# Patient Record
Sex: FEMALE | Race: BLACK OR AFRICAN AMERICAN | Employment: FULL TIME | URBAN - METROPOLITAN AREA
[De-identification: names, ages, dates, MRNs, and addresses within clinical notes are randomized per-mention and may not be internally consistent; named-entity substitution may affect disease eponyms.]

---

## 2022-12-13 ENCOUNTER — ULTRASOUND ENCOUNTER (OUTPATIENT)
Facility: CLINIC | Age: 32
End: 2022-12-13
Payer: COMMERCIAL

## 2022-12-13 ENCOUNTER — LAB ENCOUNTER (OUTPATIENT)
Dept: LAB | Age: 32
End: 2022-12-13
Attending: OBSTETRICS & GYNECOLOGY
Payer: COMMERCIAL

## 2022-12-13 ENCOUNTER — INITIAL PRENATAL (OUTPATIENT)
Facility: CLINIC | Age: 32
End: 2022-12-13
Payer: COMMERCIAL

## 2022-12-13 VITALS
SYSTOLIC BLOOD PRESSURE: 122 MMHG | HEART RATE: 94 BPM | BODY MASS INDEX: 35.16 KG/M2 | HEIGHT: 65 IN | DIASTOLIC BLOOD PRESSURE: 80 MMHG | WEIGHT: 211 LBS

## 2022-12-13 DIAGNOSIS — O36.80X0 PREGNANCY WITH INCONCLUSIVE FETAL VIABILITY, SINGLE OR UNSPECIFIED FETUS: Primary | ICD-10-CM

## 2022-12-13 DIAGNOSIS — Z12.4 CERVICAL CANCER SCREENING: ICD-10-CM

## 2022-12-13 DIAGNOSIS — Z3A.09 9 WEEKS GESTATION OF PREGNANCY: ICD-10-CM

## 2022-12-13 DIAGNOSIS — Z34.01 ENCOUNTER FOR SUPERVISION OF NORMAL FIRST PREGNANCY IN FIRST TRIMESTER: Primary | ICD-10-CM

## 2022-12-13 DIAGNOSIS — Z34.01 ENCOUNTER FOR SUPERVISION OF NORMAL FIRST PREGNANCY IN FIRST TRIMESTER: ICD-10-CM

## 2022-12-13 PROBLEM — O99.210 OBESITY AFFECTING PREGNANCY, ANTEPARTUM: Status: ACTIVE | Noted: 2022-12-13

## 2022-12-13 LAB
ALBUMIN SERPL-MCNC: 3.6 G/DL (ref 3.4–5)
ALBUMIN/GLOB SERPL: 0.8 {RATIO} (ref 1–2)
ALP LIVER SERPL-CCNC: 48 U/L
ALT SERPL-CCNC: 14 U/L
ANION GAP SERPL CALC-SCNC: 7 MMOL/L (ref 0–18)
ANTIBODY SCREEN: NEGATIVE
APPEARANCE: CLEAR
AST SERPL-CCNC: 9 U/L (ref 15–37)
BASOPHILS # BLD AUTO: 0.03 X10(3) UL (ref 0–0.2)
BASOPHILS NFR BLD AUTO: 0.4 %
BILIRUB SERPL-MCNC: 0.4 MG/DL (ref 0.1–2)
BILIRUBIN: NEGATIVE
BUN BLD-MCNC: 10 MG/DL (ref 7–18)
CALCIUM BLD-MCNC: 9.5 MG/DL (ref 8.5–10.1)
CHLORIDE SERPL-SCNC: 104 MMOL/L (ref 98–112)
CO2 SERPL-SCNC: 26 MMOL/L (ref 21–32)
CREAT BLD-MCNC: 0.69 MG/DL
EOSINOPHIL # BLD AUTO: 0.02 X10(3) UL (ref 0–0.7)
EOSINOPHIL NFR BLD AUTO: 0.3 %
ERYTHROCYTE [DISTWIDTH] IN BLOOD BY AUTOMATED COUNT: 12.4 %
EST. AVERAGE GLUCOSE BLD GHB EST-MCNC: 97 MG/DL (ref 68–126)
FASTING STATUS PATIENT QL REPORTED: YES
GFR SERPLBLD BASED ON 1.73 SQ M-ARVRAT: 118 ML/MIN/1.73M2 (ref 60–?)
GLOBULIN PLAS-MCNC: 4.4 G/DL (ref 2.8–4.4)
GLUCOSE (URINE DIPSTICK): NEGATIVE MG/DL
GLUCOSE BLD-MCNC: 88 MG/DL (ref 70–99)
HBA1C MFR BLD: 5 % (ref ?–5.7)
HBV SURFACE AG SER-ACNC: 0.29 [IU]/L
HBV SURFACE AG SERPL QL IA: NONREACTIVE
HCT VFR BLD AUTO: 39 %
HGB BLD-MCNC: 13.5 G/DL
IMM GRANULOCYTES # BLD AUTO: 0.02 X10(3) UL (ref 0–1)
IMM GRANULOCYTES NFR BLD: 0.3 %
KETONES (URINE DIPSTICK): NEGATIVE MG/DL
LEUKOCYTES: NEGATIVE
LYMPHOCYTES # BLD AUTO: 1.93 X10(3) UL (ref 1–4)
LYMPHOCYTES NFR BLD AUTO: 25.7 %
MCH RBC QN AUTO: 27.4 PG (ref 26–34)
MCHC RBC AUTO-ENTMCNC: 34.6 G/DL (ref 31–37)
MCV RBC AUTO: 79.1 FL
MONOCYTES # BLD AUTO: 0.37 X10(3) UL (ref 0.1–1)
MONOCYTES NFR BLD AUTO: 4.9 %
MULTISTIX LOT#: NORMAL NUMERIC
NEUTROPHILS # BLD AUTO: 5.15 X10 (3) UL (ref 1.5–7.7)
NEUTROPHILS # BLD AUTO: 5.15 X10(3) UL (ref 1.5–7.7)
NEUTROPHILS NFR BLD AUTO: 68.4 %
NITRITE, URINE: NEGATIVE
OCCULT BLOOD: NEGATIVE
OSMOLALITY SERPL CALC.SUM OF ELEC: 282 MOSM/KG (ref 275–295)
PH, URINE: 7.5 (ref 4.5–8)
PLATELET # BLD AUTO: 231 10(3)UL (ref 150–450)
POTASSIUM SERPL-SCNC: 3.9 MMOL/L (ref 3.5–5.1)
PROT SERPL-MCNC: 8 G/DL (ref 6.4–8.2)
PROTEIN (URINE DIPSTICK): NEGATIVE MG/DL
RBC # BLD AUTO: 4.93 X10(6)UL
RH BLOOD TYPE: POSITIVE
RUBV IGG SER QL: POSITIVE
RUBV IGG SER-ACNC: 63.2 IU/ML (ref 10–?)
SODIUM SERPL-SCNC: 137 MMOL/L (ref 136–145)
SPECIFIC GRAVITY: 1.02 (ref 1–1.03)
T PALLIDUM AB SER QL IA: REACTIVE
URINE-COLOR: YELLOW
UROBILINOGEN,SEMI-QN: 0.2 MG/DL (ref 0–1.9)
WBC # BLD AUTO: 7.5 X10(3) UL (ref 4–11)

## 2022-12-13 PROCEDURE — 83036 HEMOGLOBIN GLYCOSYLATED A1C: CPT | Performed by: OBSTETRICS & GYNECOLOGY

## 2022-12-13 PROCEDURE — 87591 N.GONORRHOEAE DNA AMP PROB: CPT | Performed by: OBSTETRICS & GYNECOLOGY

## 2022-12-13 PROCEDURE — 3079F DIAST BP 80-89 MM HG: CPT | Performed by: OBSTETRICS & GYNECOLOGY

## 2022-12-13 PROCEDURE — 3074F SYST BP LT 130 MM HG: CPT | Performed by: OBSTETRICS & GYNECOLOGY

## 2022-12-13 PROCEDURE — 81002 URINALYSIS NONAUTO W/O SCOPE: CPT | Performed by: OBSTETRICS & GYNECOLOGY

## 2022-12-13 PROCEDURE — 87086 URINE CULTURE/COLONY COUNT: CPT | Performed by: OBSTETRICS & GYNECOLOGY

## 2022-12-13 PROCEDURE — 87491 CHLMYD TRACH DNA AMP PROBE: CPT | Performed by: OBSTETRICS & GYNECOLOGY

## 2022-12-13 PROCEDURE — 3008F BODY MASS INDEX DOCD: CPT | Performed by: OBSTETRICS & GYNECOLOGY

## 2022-12-13 PROCEDURE — 86803 HEPATITIS C AB TEST: CPT | Performed by: OBSTETRICS & GYNECOLOGY

## 2022-12-13 PROCEDURE — 76801 OB US < 14 WKS SINGLE FETUS: CPT | Performed by: OBSTETRICS & GYNECOLOGY

## 2022-12-13 PROCEDURE — 87624 HPV HI-RISK TYP POOLED RSLT: CPT | Performed by: OBSTETRICS & GYNECOLOGY

## 2022-12-13 PROCEDURE — 80081 OBSTETRIC PANEL INC HIV TSTG: CPT | Performed by: OBSTETRICS & GYNECOLOGY

## 2022-12-13 PROCEDURE — 87522 HEPATITIS C REVRS TRNSCRPJ: CPT | Performed by: OBSTETRICS & GYNECOLOGY

## 2022-12-13 PROCEDURE — 80053 COMPREHEN METABOLIC PANEL: CPT | Performed by: OBSTETRICS & GYNECOLOGY

## 2022-12-13 RX ORDER — PRENATAL VIT/IRON FUM/FOLIC AC 27MG-0.8MG
TABLET ORAL
COMMUNITY
Start: 2022-11-11

## 2022-12-13 NOTE — PROGRESS NOTES
New OB  - denies h/o HSV, blood transfusion, hepatitis  - patients son diagnosed with DM Type 1 at age 1, patient believes the fathers side has a history, no DM h/o on patients side  - discussed cfDNA - will do next visit  - EDC by LMP c/w 9w2d US    1.  Obesity  - L2U  - discussed weight gain limit  - CMP, HbA1C ordered  - NST 36 weeks

## 2022-12-14 LAB
C TRACH DNA SPEC QL NAA+PROBE: NEGATIVE
HPV I/H RISK 1 DNA SPEC QL NAA+PROBE: NEGATIVE
N GONORRHOEA DNA SPEC QL NAA+PROBE: NEGATIVE
RPR SER QL: NONREACTIVE

## 2022-12-15 LAB
HCV QNT BY NAAT (IU/ML): NOT DETECTED IU/ML
HCV QNT BY NAAT (LOG IU/ML): NOT DETECTED LOG IU/ML
HCV QNT BY NAAT INTERP: NOT DETECTED

## 2022-12-16 LAB — TREPONEMA PALLIDUM AB BY TP-PA: REACTIVE

## 2022-12-20 ENCOUNTER — TELEPHONE (OUTPATIENT)
Facility: CLINIC | Age: 32
End: 2022-12-20

## 2022-12-20 NOTE — TELEPHONE ENCOUNTER
Called patient r/t her test results. Not able to LM, mail box full.  Thucy message send to patient to call office

## 2022-12-20 NOTE — TELEPHONE ENCOUNTER
Pt calling back for test results. Discussed results that were resulted by EP were normal: GC, UA c/s     will route to provider to review other results    Patient verbalized understanding.

## 2022-12-20 NOTE — TELEPHONE ENCOUNTER
Pt tested + for Syphilis a few years back and was treated, No s/s at this time will route to Dr. Hanh Ly if tx is needed. Patient verbalized understanding.

## 2022-12-20 NOTE — TELEPHONE ENCOUNTER
IDPH Report submitted to [facility] Department [facility]

## 2023-01-04 NOTE — TELEPHONE ENCOUNTER
Per Health Department, patient instructed to call [facility] at [phone] to schedule Penicillin G injection. Patient verbalizes understanding.

## 2023-01-04 NOTE — TELEPHONE ENCOUNTER
Per [facility] department recommendations additional information obtained from patient. Patient was treated approximately in 2015 in [place], does not remember exact location but will try to get the information. Per Health Department, if no prove of treatment is available, patient should be treated with Penicillin G 2.4 million units IM x 2, 1 week apart. Will communicate with provider and call patient back.

## 2023-01-10 ENCOUNTER — ROUTINE PRENATAL (OUTPATIENT)
Facility: CLINIC | Age: 33
End: 2023-01-10
Payer: COMMERCIAL

## 2023-01-10 ENCOUNTER — TELEPHONE (OUTPATIENT)
Facility: CLINIC | Age: 33
End: 2023-01-10

## 2023-01-10 VITALS
HEIGHT: 65 IN | BODY MASS INDEX: 34.72 KG/M2 | HEART RATE: 104 BPM | SYSTOLIC BLOOD PRESSURE: 126 MMHG | DIASTOLIC BLOOD PRESSURE: 72 MMHG | WEIGHT: 208.38 LBS

## 2023-01-10 DIAGNOSIS — Z87.59 HISTORY OF PRIOR PREGNANCY WITH SGA NEWBORN: ICD-10-CM

## 2023-01-10 DIAGNOSIS — Z13.1 SCREENING FOR DIABETES MELLITUS: ICD-10-CM

## 2023-01-10 DIAGNOSIS — O99.210 OBESITY AFFECTING PREGNANCY, ANTEPARTUM: Primary | ICD-10-CM

## 2023-01-10 DIAGNOSIS — D57.3 SICKLE CELL TRAIT IN MOTHER AFFECTING PREGNANCY (HCC): ICD-10-CM

## 2023-01-10 DIAGNOSIS — Z13.71 SCREENING FOR GENETIC DISEASE CARRIER STATUS: ICD-10-CM

## 2023-01-10 DIAGNOSIS — O09.891 HISTORY OF MATERNAL SYPHILIS, CURRENTLY PREGNANT IN FIRST TRIMESTER: ICD-10-CM

## 2023-01-10 DIAGNOSIS — Z36.1 ANTENATAL SCREENING FOR RAISED ALPHAFETOPROTEIN LEVEL: ICD-10-CM

## 2023-01-10 DIAGNOSIS — Z36.89 ENCOUNTER FOR FETAL ANATOMIC SURVEY: ICD-10-CM

## 2023-01-10 DIAGNOSIS — O99.019 SICKLE CELL TRAIT IN MOTHER AFFECTING PREGNANCY (HCC): ICD-10-CM

## 2023-01-10 DIAGNOSIS — Z36.89: ICD-10-CM

## 2023-01-10 LAB
GLUCOSE (URINE DIPSTICK): NEGATIVE MG/DL
MULTISTIX LOT#: NORMAL NUMERIC
PROTEIN (URINE DIPSTICK): NEGATIVE MG/DL

## 2023-01-10 PROCEDURE — 3078F DIAST BP <80 MM HG: CPT | Performed by: OBSTETRICS & GYNECOLOGY

## 2023-01-10 PROCEDURE — 81002 URINALYSIS NONAUTO W/O SCOPE: CPT | Performed by: OBSTETRICS & GYNECOLOGY

## 2023-01-10 PROCEDURE — 3008F BODY MASS INDEX DOCD: CPT | Performed by: OBSTETRICS & GYNECOLOGY

## 2023-01-10 PROCEDURE — 3074F SYST BP LT 130 MM HG: CPT | Performed by: OBSTETRICS & GYNECOLOGY

## 2023-01-10 NOTE — TELEPHONE ENCOUNTER
Patients name &  verified with patient   Lab tubes labeled    NIPS/carrier screen lab drawn  Patient tolerated well. Tubes placed in basket. INVITAE access, cost, call in 2-4 weeks for result discussed. Patient verbalized understanding.

## 2023-01-10 NOTE — PROGRESS NOTES
PATIENT DOES NOT WANT HER HISTORY OF SYPHILIS DISCUSSED IN FRONT OF ANYONE  She is alone today. MELE    No vaginal bleeding. No cramping. No N&V.     28year old  at 13w2d   KUSH 23 by LMP c/w 9w2d US  AB+    -aneuploidy screening - desires cfDNA today.   -carrier screening - will draw today. Reports she has sickle cell trait. Partner - not sure of his status. Carrier screening information. He has different insurance. Advise having have testing. -AFP information   -COVID-19 vaccination done. Bivalent booster encouraged.   -Flu vaccination done for  season    1. Obesity (pre-preg BMI 35) & H/o what sounds like SGA infant with prior partner   - limit wt gain 11-20 lb   - aspirin discussed   - baseline CMP & A1c ok. - early 1 hr GTT advised & ordered   - L2 US order placed   - 3rd trimester growth US   - NST 36 wk     2. Reactive Trep Ab   -22 Initial OB labs Reactive Trep Ab, non-reactive RPR  -H/o syphilis  -Per [facility] department recommendations additional information obtained from patient. Patient was treated approximately in  in [place], does not remember exact location but will try to get the information. Per Health Department, if no prove of treatment is available, patient should be treated with Penicillin G 2.4 million units IM x 2, 1 week apart. Will communicate with provider and call patient back.   -Per Health Department, patient instructed to call [facility] at [phone] to schedule Penicillin G injection. Patient verbalizes understanding.  -she has not started treatment.   -as of 1/10/2023 - thinking might have been Jossue King. Waiting to hear back. 3. Sickle cell trait   -per patient report.  Encouraged partner testing due to risk for sickle cell disease     RTC 4 wk

## 2023-01-14 LAB
AMB EXT MYRIAD TRISOMY 13: NEGATIVE
AMB EXT MYRIAD TRISOMY 18: NEGATIVE
AMB EXT MYRIAD TRISOMY 21: NEGATIVE

## 2023-01-16 ENCOUNTER — TELEPHONE (OUTPATIENT)
Facility: CLINIC | Age: 33
End: 2023-01-16

## 2023-01-16 NOTE — TELEPHONE ENCOUNTER
Spoke with pt. She wanted me to tell her 15year old son the gender so he could surprise her. I let him know the predicted gender is male. Results released to pt. Verbalized understanding.

## 2023-01-16 NOTE — TELEPHONE ENCOUNTER
Pt calling to find out the gender results from the prenatal screening. Please release results to pt.

## 2023-02-07 ENCOUNTER — ROUTINE PRENATAL (OUTPATIENT)
Facility: CLINIC | Age: 33
End: 2023-02-07
Payer: COMMERCIAL

## 2023-02-07 ENCOUNTER — TELEPHONE (OUTPATIENT)
Facility: CLINIC | Age: 33
End: 2023-02-07

## 2023-02-07 VITALS
HEIGHT: 65 IN | HEART RATE: 102 BPM | SYSTOLIC BLOOD PRESSURE: 120 MMHG | WEIGHT: 213 LBS | BODY MASS INDEX: 35.49 KG/M2 | DIASTOLIC BLOOD PRESSURE: 70 MMHG

## 2023-02-07 DIAGNOSIS — Z36.89 ENCOUNTER FOR FETAL ANATOMIC SURVEY: ICD-10-CM

## 2023-02-07 DIAGNOSIS — Z36.9 PRENATAL SCREENING ENCOUNTER: Primary | ICD-10-CM

## 2023-02-07 PROCEDURE — 81002 URINALYSIS NONAUTO W/O SCOPE: CPT

## 2023-02-07 PROCEDURE — 3074F SYST BP LT 130 MM HG: CPT

## 2023-02-07 PROCEDURE — 3008F BODY MASS INDEX DOCD: CPT

## 2023-02-07 PROCEDURE — 3078F DIAST BP <80 MM HG: CPT

## 2023-02-07 NOTE — TELEPHONE ENCOUNTER
Pt has not started tx for syphillis. Someone was trying to get records from different facility so she doesn't need to get treatment bec it from the past.     Will route to TK     Patient verbalized understanding.

## 2023-02-07 NOTE — TELEPHONE ENCOUNTER
----- Message from SUNDAY Iglesias sent at 2/7/2023 11:18 AM CST -----  Regarding: Trep treatment  Can you ask her if started treatment yet? I was not able to because her partner was in the room and she does not want anyone to know she tested positive for syphilis. It said in the last note she has not started treatment. Thanks.

## 2023-02-28 ENCOUNTER — ROUTINE PRENATAL (OUTPATIENT)
Facility: CLINIC | Age: 33
End: 2023-02-28
Payer: COMMERCIAL

## 2023-02-28 ENCOUNTER — ULTRASOUND ENCOUNTER (OUTPATIENT)
Facility: CLINIC | Age: 33
End: 2023-02-28
Payer: COMMERCIAL

## 2023-02-28 VITALS
HEIGHT: 65 IN | BODY MASS INDEX: 36.85 KG/M2 | HEART RATE: 89 BPM | WEIGHT: 221.19 LBS | DIASTOLIC BLOOD PRESSURE: 74 MMHG | SYSTOLIC BLOOD PRESSURE: 122 MMHG

## 2023-02-28 DIAGNOSIS — Z34.92 ENCOUNTER FOR SUPERVISION OF NORMAL PREGNANCY IN SECOND TRIMESTER, UNSPECIFIED GRAVIDITY: Primary | ICD-10-CM

## 2023-02-28 PROCEDURE — 3074F SYST BP LT 130 MM HG: CPT

## 2023-02-28 PROCEDURE — 81002 URINALYSIS NONAUTO W/O SCOPE: CPT

## 2023-02-28 PROCEDURE — 3078F DIAST BP <80 MM HG: CPT

## 2023-02-28 PROCEDURE — 3008F BODY MASS INDEX DOCD: CPT

## 2023-03-03 DIAGNOSIS — Z36.2 ENCOUNTER FOR FOLLOW-UP ULTRASOUND OF FETAL ANATOMY: Primary | ICD-10-CM

## 2023-03-16 ENCOUNTER — APPOINTMENT (OUTPATIENT)
Facility: CLINIC | Age: 33
End: 2023-03-16
Payer: COMMERCIAL

## 2023-03-16 DIAGNOSIS — Z36.2 ENCOUNTER FOR FOLLOW-UP ULTRASOUND OF FETAL ANATOMY: ICD-10-CM

## 2023-03-16 PROCEDURE — 76816 OB US FOLLOW-UP PER FETUS: CPT | Performed by: STUDENT IN AN ORGANIZED HEALTH CARE EDUCATION/TRAINING PROGRAM

## 2023-03-17 ENCOUNTER — TELEPHONE (OUTPATIENT)
Dept: OBGYN CLINIC | Facility: CLINIC | Age: 33
End: 2023-03-17

## 2023-03-17 NOTE — PROGRESS NOTES
Relayed recommends growth ultrasound in 3rd trimester due to marginal cord insertion. Pt would like further discussion, request for phone call, will route to provider. Patient verbalized understanding, agreed to and intend to comply with plan of care.

## 2023-03-17 NOTE — TELEPHONE ENCOUNTER
Discussed with patient KARI recommendations. Pt googled marginal cord insertion and is worried about the umbilical cord tearing away from the placenta. Reassured patient. Discussed with patient marginal cord insertion may affect fetal growth thus the reason for the 32 week growth ultrasound.

## 2023-03-30 ENCOUNTER — TELEPHONE (OUTPATIENT)
Facility: CLINIC | Age: 33
End: 2023-03-30

## 2023-03-30 ENCOUNTER — ROUTINE PRENATAL (OUTPATIENT)
Facility: CLINIC | Age: 33
End: 2023-03-30
Payer: COMMERCIAL

## 2023-03-30 VITALS
WEIGHT: 227 LBS | HEART RATE: 94 BPM | DIASTOLIC BLOOD PRESSURE: 68 MMHG | BODY MASS INDEX: 37.82 KG/M2 | SYSTOLIC BLOOD PRESSURE: 118 MMHG | HEIGHT: 65 IN

## 2023-03-30 DIAGNOSIS — Z34.92 ENCOUNTER FOR SUPERVISION OF NORMAL PREGNANCY IN SECOND TRIMESTER, UNSPECIFIED GRAVIDITY: Primary | ICD-10-CM

## 2023-03-30 PROCEDURE — 3008F BODY MASS INDEX DOCD: CPT | Performed by: STUDENT IN AN ORGANIZED HEALTH CARE EDUCATION/TRAINING PROGRAM

## 2023-03-30 PROCEDURE — 3074F SYST BP LT 130 MM HG: CPT | Performed by: STUDENT IN AN ORGANIZED HEALTH CARE EDUCATION/TRAINING PROGRAM

## 2023-03-30 PROCEDURE — 3078F DIAST BP <80 MM HG: CPT | Performed by: STUDENT IN AN ORGANIZED HEALTH CARE EDUCATION/TRAINING PROGRAM

## 2023-03-30 PROCEDURE — 81002 URINALYSIS NONAUTO W/O SCOPE: CPT | Performed by: STUDENT IN AN ORGANIZED HEALTH CARE EDUCATION/TRAINING PROGRAM

## 2023-03-30 NOTE — TELEPHONE ENCOUNTER
Attempted to call [facility] department. The office is closed. Will route message for follow up. See Dr Chasity Yousif staff message.

## 2023-03-30 NOTE — TELEPHONE ENCOUNTER
----- Message from Jocelynn Akbar MD sent at 3/30/2023  3:33 PM CDT -----  Regarding: health department follow up  This is the patient who had syphilis years ago and was treated in [place] where she was living at the time. Because one of her syphilis tests on prenatal labs was positive (treponemal Ab), even though the RPR was negative (meaning no active disease, pt had past infection), the [facility] says because they don't have records of pt being treated in [facility] that they either need her records from [facility] or pt needs to be treated again. Pt says today that health dept employee was communicating with her frequently for a while when they requested her records from [facility] but there was some isssue with [facility] sending the records here. Pt says then the [address] stopped calling her and she never found out what happened (ie whether they got proof of her past treatment or not).   Long story short, can someone follow up with health dept and let patient know what next step is?    thanks

## 2023-03-31 NOTE — TELEPHONE ENCOUNTER
Spoke with the health department. They received a verbal from the [facility] that pt was treated. They have tried numerous times to have records faxed. Unable to get records. Legally they need written proof that pt was treated otherwise pt will have to be retreated prior to delivery. Treatment will have to be 3 injections 7 days apart grewal to latent treatment. I let her know I would get in touch with the pt and let her know.

## 2023-03-31 NOTE — TELEPHONE ENCOUNTER
Spoke with pt. She has also tried numerous times to get the records with no luck. She states she will just be retreated so it will be on record in [place]. I did inform her it would be 3 injections each 7 days apart. Pt would like to have the injections done here. I let her know I would see if we can give them to her in the office and call with recommendations. Verbalized understanding.

## 2023-04-04 NOTE — TELEPHONE ENCOUNTER
Spoke with pt. Aware she will need to go to the health department for syphilis treatment. Verbalized understanding.

## 2023-04-16 ENCOUNTER — PATIENT MESSAGE (OUTPATIENT)
Facility: CLINIC | Age: 33
End: 2023-04-16

## 2023-04-26 ENCOUNTER — LAB ENCOUNTER (OUTPATIENT)
Dept: LAB | Age: 33
End: 2023-04-26
Attending: STUDENT IN AN ORGANIZED HEALTH CARE EDUCATION/TRAINING PROGRAM
Payer: COMMERCIAL

## 2023-04-26 ENCOUNTER — PATIENT MESSAGE (OUTPATIENT)
Facility: CLINIC | Age: 33
End: 2023-04-26

## 2023-04-26 DIAGNOSIS — Z34.92 ENCOUNTER FOR SUPERVISION OF NORMAL PREGNANCY IN SECOND TRIMESTER, UNSPECIFIED GRAVIDITY: ICD-10-CM

## 2023-04-26 DIAGNOSIS — Z13.1 SCREENING FOR DIABETES MELLITUS: ICD-10-CM

## 2023-04-26 DIAGNOSIS — O99.210 OBESITY AFFECTING PREGNANCY, ANTEPARTUM: ICD-10-CM

## 2023-04-26 LAB
BASOPHILS # BLD AUTO: 0.05 X10(3) UL (ref 0–0.2)
BASOPHILS NFR BLD AUTO: 0.4 %
EOSINOPHIL # BLD AUTO: 0.2 X10(3) UL (ref 0–0.7)
EOSINOPHIL NFR BLD AUTO: 1.8 %
ERYTHROCYTE [DISTWIDTH] IN BLOOD BY AUTOMATED COUNT: 12.6 %
GLUCOSE 1H P GLC SERPL-MCNC: 126 MG/DL
HCT VFR BLD AUTO: 33.7 %
HGB BLD-MCNC: 11.7 G/DL
IMM GRANULOCYTES # BLD AUTO: 0.06 X10(3) UL (ref 0–1)
IMM GRANULOCYTES NFR BLD: 0.5 %
LYMPHOCYTES # BLD AUTO: 1.46 X10(3) UL (ref 1–4)
LYMPHOCYTES NFR BLD AUTO: 13.1 %
MCH RBC QN AUTO: 29.2 PG (ref 26–34)
MCHC RBC AUTO-ENTMCNC: 34.7 G/DL (ref 31–37)
MCV RBC AUTO: 84 FL
MONOCYTES # BLD AUTO: 0.42 X10(3) UL (ref 0.1–1)
MONOCYTES NFR BLD AUTO: 3.8 %
NEUTROPHILS # BLD AUTO: 8.96 X10 (3) UL (ref 1.5–7.7)
NEUTROPHILS # BLD AUTO: 8.96 X10(3) UL (ref 1.5–7.7)
NEUTROPHILS NFR BLD AUTO: 80.4 %
PLATELET # BLD AUTO: 172 10(3)UL (ref 150–450)
RBC # BLD AUTO: 4.01 X10(6)UL
WBC # BLD AUTO: 11.2 X10(3) UL (ref 4–11)

## 2023-04-26 PROCEDURE — 36415 COLL VENOUS BLD VENIPUNCTURE: CPT

## 2023-04-26 PROCEDURE — 85025 COMPLETE CBC W/AUTO DIFF WBC: CPT

## 2023-04-26 PROCEDURE — 82950 GLUCOSE TEST: CPT

## 2023-04-26 NOTE — TELEPHONE ENCOUNTER
From: Joni Bowie  To:  SUNDAY Faust  Sent: 4/26/2023 12:06 PM CDT  Subject: Question regarding CBC (W/DIFF,PLT) REFLEX TO FERRITIN    Can someone call me to go over the result of the lab test when available please

## 2023-04-27 ENCOUNTER — ROUTINE PRENATAL (OUTPATIENT)
Facility: CLINIC | Age: 33
End: 2023-04-27
Payer: COMMERCIAL

## 2023-04-27 VITALS
HEART RATE: 117 BPM | SYSTOLIC BLOOD PRESSURE: 118 MMHG | HEIGHT: 65 IN | DIASTOLIC BLOOD PRESSURE: 64 MMHG | WEIGHT: 231 LBS | BODY MASS INDEX: 38.49 KG/M2

## 2023-04-27 DIAGNOSIS — O99.210 OBESITY AFFECTING PREGNANCY, ANTEPARTUM: ICD-10-CM

## 2023-04-27 DIAGNOSIS — Z11.4 ENCOUNTER FOR SCREENING FOR HIV: ICD-10-CM

## 2023-04-27 DIAGNOSIS — Z34.82 PRENATAL CARE, SUBSEQUENT PREGNANCY, SECOND TRIMESTER: Primary | ICD-10-CM

## 2023-04-27 PROCEDURE — 3078F DIAST BP <80 MM HG: CPT

## 2023-04-27 PROCEDURE — 3074F SYST BP LT 130 MM HG: CPT

## 2023-04-27 PROCEDURE — 3008F BODY MASS INDEX DOCD: CPT

## 2023-04-27 PROCEDURE — 81002 URINALYSIS NONAUTO W/O SCOPE: CPT

## 2023-05-09 ENCOUNTER — ROUTINE PRENATAL (OUTPATIENT)
Facility: CLINIC | Age: 33
End: 2023-05-09
Payer: COMMERCIAL

## 2023-05-09 VITALS
HEIGHT: 65 IN | WEIGHT: 234.63 LBS | HEART RATE: 112 BPM | BODY MASS INDEX: 39.09 KG/M2 | SYSTOLIC BLOOD PRESSURE: 120 MMHG | DIASTOLIC BLOOD PRESSURE: 86 MMHG

## 2023-05-09 DIAGNOSIS — O99.213 OBESITY AFFECTING PREGNANCY IN THIRD TRIMESTER: Primary | ICD-10-CM

## 2023-05-09 DIAGNOSIS — O99.019 SICKLE CELL TRAIT IN MOTHER AFFECTING PREGNANCY (HCC): ICD-10-CM

## 2023-05-09 DIAGNOSIS — D57.3 SICKLE CELL TRAIT IN MOTHER AFFECTING PREGNANCY (HCC): ICD-10-CM

## 2023-05-09 DIAGNOSIS — Z87.59 HISTORY OF PRIOR PREGNANCY WITH SGA NEWBORN: ICD-10-CM

## 2023-05-09 DIAGNOSIS — Z23 NEED FOR TDAP VACCINATION: ICD-10-CM

## 2023-05-09 DIAGNOSIS — O09.893 HISTORY OF MATERNAL SYPHILIS, CURRENTLY PREGNANT IN THIRD TRIMESTER: ICD-10-CM

## 2023-05-09 DIAGNOSIS — O43.193 MARGINAL INSERTION OF UMBILICAL CORD AFFECTING MANAGEMENT OF MOTHER IN THIRD TRIMESTER: ICD-10-CM

## 2023-05-09 DIAGNOSIS — Z34.83 PRENATAL CARE, SUBSEQUENT PREGNANCY, THIRD TRIMESTER: ICD-10-CM

## 2023-05-09 PROCEDURE — 81002 URINALYSIS NONAUTO W/O SCOPE: CPT | Performed by: OBSTETRICS & GYNECOLOGY

## 2023-05-09 PROCEDURE — 3008F BODY MASS INDEX DOCD: CPT | Performed by: OBSTETRICS & GYNECOLOGY

## 2023-05-09 PROCEDURE — 90715 TDAP VACCINE 7 YRS/> IM: CPT | Performed by: OBSTETRICS & GYNECOLOGY

## 2023-05-09 PROCEDURE — 3074F SYST BP LT 130 MM HG: CPT | Performed by: OBSTETRICS & GYNECOLOGY

## 2023-05-09 PROCEDURE — 90471 IMMUNIZATION ADMIN: CPT | Performed by: OBSTETRICS & GYNECOLOGY

## 2023-05-09 PROCEDURE — 3079F DIAST BP 80-89 MM HG: CPT | Performed by: OBSTETRICS & GYNECOLOGY

## 2023-05-09 NOTE — PATIENT INSTRUCTIONS
Schedule ultrasound for 32 weeks with  staff    Have 3rd trimester HIV test done    Third trimester HIV testing  [place] law mandates every pregnant woman is tested for HIV once at the start of prenatal care and again in the 3rd trimester (27 weeks 0 days and beyond).      Tdap (Tetanus, Diptheria, Pertussis)   -booster shot for pertussis (whooping cough)  -recommended by the CDC (Centers for Disease Control) for every pregnant woman in the third trimester (28 weeks and beyond)  -the purpose of giving the vaccine during pregnancy is so that the mother can share her antibodies with the fetus across the placenta  -it is recommended to take this vaccine early in the third trimester so that your body has enough time to produce the antibodies that can pass across the placenta to the fetus  -the infant cannot be vaccinated for pertussis until 3months of age due to an immature immune system and lack of response to the vaccine prior to that time.   -the father of the baby as well as grandparents, other caregivers, etc should receive a booster shot for whooping cough as well (vaccination at least 1 time after the age of 25 is sufficient)

## 2023-05-10 ENCOUNTER — TELEPHONE (OUTPATIENT)
Facility: CLINIC | Age: 33
End: 2023-05-10

## 2023-05-10 NOTE — TELEPHONE ENCOUNTER
----- Message from Luis Saul MD sent at 5/9/2023  9:35 AM CDT -----  Regarding: Did we ever receive records that patient was for sure treated for syphilis? ?? THANKS!

## 2023-05-12 NOTE — TELEPHONE ENCOUNTER
Spoke with Marilu Nguyễn RN at the [facility] and confirmed that there was no written confirmation of treatment received from previous clinic. Per Shirley Kingsley RN patient should complete treatment with 3 doses, 7 days apart. Called patient, informed of recommendations, phone number [phone] provided to patient and instructed to call and schedule appointment with the [facility] to schedule appointment. Patient verbalizes understanding.

## 2023-05-25 ENCOUNTER — ROUTINE PRENATAL (OUTPATIENT)
Facility: CLINIC | Age: 33
End: 2023-05-25
Payer: COMMERCIAL

## 2023-05-25 ENCOUNTER — ULTRASOUND ENCOUNTER (OUTPATIENT)
Dept: OBGYN CLINIC | Facility: CLINIC | Age: 33
End: 2023-05-25
Payer: COMMERCIAL

## 2023-05-25 VITALS
HEIGHT: 65 IN | DIASTOLIC BLOOD PRESSURE: 62 MMHG | HEART RATE: 106 BPM | SYSTOLIC BLOOD PRESSURE: 122 MMHG | BODY MASS INDEX: 39.82 KG/M2 | WEIGHT: 239 LBS

## 2023-05-25 DIAGNOSIS — D57.3 SICKLE CELL TRAIT (HCC): ICD-10-CM

## 2023-05-25 DIAGNOSIS — Z34.83 PRENATAL CARE, SUBSEQUENT PREGNANCY, THIRD TRIMESTER: Primary | ICD-10-CM

## 2023-05-25 DIAGNOSIS — O43.193 MARGINAL INSERTION OF UMBILICAL CORD AFFECTING MANAGEMENT OF MOTHER IN THIRD TRIMESTER: ICD-10-CM

## 2023-05-25 DIAGNOSIS — O99.210 OBESITY AFFECTING PREGNANCY, ANTEPARTUM: ICD-10-CM

## 2023-05-25 DIAGNOSIS — O09.893 HISTORY OF MATERNAL SYPHILIS, CURRENTLY PREGNANT IN THIRD TRIMESTER: ICD-10-CM

## 2023-05-31 ENCOUNTER — TELEPHONE (OUTPATIENT)
Facility: CLINIC | Age: 33
End: 2023-05-31

## 2023-06-08 ENCOUNTER — ROUTINE PRENATAL (OUTPATIENT)
Facility: CLINIC | Age: 33
End: 2023-06-08
Payer: COMMERCIAL

## 2023-06-08 VITALS
SYSTOLIC BLOOD PRESSURE: 118 MMHG | WEIGHT: 239.81 LBS | DIASTOLIC BLOOD PRESSURE: 66 MMHG | HEIGHT: 65 IN | BODY MASS INDEX: 39.96 KG/M2 | HEART RATE: 116 BPM

## 2023-06-08 DIAGNOSIS — Z34.93 ENCOUNTER FOR SUPERVISION OF NORMAL PREGNANCY IN THIRD TRIMESTER, UNSPECIFIED GRAVIDITY: Primary | ICD-10-CM

## 2023-06-08 PROCEDURE — 3008F BODY MASS INDEX DOCD: CPT | Performed by: OBSTETRICS & GYNECOLOGY

## 2023-06-08 PROCEDURE — 81002 URINALYSIS NONAUTO W/O SCOPE: CPT | Performed by: OBSTETRICS & GYNECOLOGY

## 2023-06-08 PROCEDURE — 3074F SYST BP LT 130 MM HG: CPT | Performed by: OBSTETRICS & GYNECOLOGY

## 2023-06-08 PROCEDURE — 3078F DIAST BP <80 MM HG: CPT | Performed by: OBSTETRICS & GYNECOLOGY

## 2023-06-22 ENCOUNTER — ROUTINE PRENATAL (OUTPATIENT)
Facility: CLINIC | Age: 33
End: 2023-06-22
Payer: COMMERCIAL

## 2023-06-22 VITALS
WEIGHT: 243 LBS | BODY MASS INDEX: 40.48 KG/M2 | HEIGHT: 65 IN | SYSTOLIC BLOOD PRESSURE: 120 MMHG | DIASTOLIC BLOOD PRESSURE: 68 MMHG | HEART RATE: 122 BPM

## 2023-06-22 DIAGNOSIS — Z3A.36 36 WEEKS GESTATION OF PREGNANCY: ICD-10-CM

## 2023-06-22 DIAGNOSIS — Z34.93 ENCOUNTER FOR SUPERVISION OF NORMAL PREGNANCY IN THIRD TRIMESTER, UNSPECIFIED GRAVIDITY: Primary | ICD-10-CM

## 2023-06-22 DIAGNOSIS — O99.210 OBESITY AFFECTING PREGNANCY, ANTEPARTUM: ICD-10-CM

## 2023-06-22 PROCEDURE — 3078F DIAST BP <80 MM HG: CPT

## 2023-06-22 PROCEDURE — 3074F SYST BP LT 130 MM HG: CPT

## 2023-06-22 PROCEDURE — 3008F BODY MASS INDEX DOCD: CPT

## 2023-06-22 PROCEDURE — 59025 FETAL NON-STRESS TEST: CPT

## 2023-06-22 PROCEDURE — 81002 URINALYSIS NONAUTO W/O SCOPE: CPT

## 2023-06-22 PROCEDURE — 87081 CULTURE SCREEN ONLY: CPT

## 2023-06-30 ENCOUNTER — ROUTINE PRENATAL (OUTPATIENT)
Facility: CLINIC | Age: 33
End: 2023-06-30
Payer: COMMERCIAL

## 2023-06-30 ENCOUNTER — LAB ENCOUNTER (OUTPATIENT)
Dept: LAB | Age: 33
End: 2023-06-30
Payer: COMMERCIAL

## 2023-06-30 VITALS
BODY MASS INDEX: 40.38 KG/M2 | SYSTOLIC BLOOD PRESSURE: 126 MMHG | HEIGHT: 65 IN | DIASTOLIC BLOOD PRESSURE: 70 MMHG | WEIGHT: 242.38 LBS | HEART RATE: 119 BPM

## 2023-06-30 DIAGNOSIS — O99.213 OBESITY AFFECTING PREGNANCY IN THIRD TRIMESTER: Primary | ICD-10-CM

## 2023-06-30 DIAGNOSIS — O99.019 SICKLE CELL TRAIT IN MOTHER AFFECTING PREGNANCY (HCC): ICD-10-CM

## 2023-06-30 DIAGNOSIS — Z11.4 ENCOUNTER FOR SCREENING FOR HIV: ICD-10-CM

## 2023-06-30 DIAGNOSIS — D57.3 SICKLE CELL TRAIT IN MOTHER AFFECTING PREGNANCY (HCC): ICD-10-CM

## 2023-06-30 DIAGNOSIS — Z34.83 PRENATAL CARE, SUBSEQUENT PREGNANCY IN THIRD TRIMESTER: ICD-10-CM

## 2023-06-30 DIAGNOSIS — O26.03 EXCESSIVE WEIGHT GAIN DURING PREGNANCY IN THIRD TRIMESTER: ICD-10-CM

## 2023-06-30 DIAGNOSIS — Z87.59 HISTORY OF PRIOR PREGNANCY WITH SGA NEWBORN: ICD-10-CM

## 2023-06-30 DIAGNOSIS — O09.893 HISTORY OF MATERNAL SYPHILIS, CURRENTLY PREGNANT IN THIRD TRIMESTER: ICD-10-CM

## 2023-06-30 DIAGNOSIS — Z22.330 GBS CARRIER: ICD-10-CM

## 2023-06-30 DIAGNOSIS — O43.193 MARGINAL INSERTION OF UMBILICAL CORD AFFECTING MANAGEMENT OF MOTHER IN THIRD TRIMESTER: ICD-10-CM

## 2023-06-30 PROCEDURE — 87389 HIV-1 AG W/HIV-1&-2 AB AG IA: CPT

## 2023-06-30 PROCEDURE — 3078F DIAST BP <80 MM HG: CPT | Performed by: OBSTETRICS & GYNECOLOGY

## 2023-06-30 PROCEDURE — 36415 COLL VENOUS BLD VENIPUNCTURE: CPT

## 2023-06-30 PROCEDURE — 59025 FETAL NON-STRESS TEST: CPT | Performed by: OBSTETRICS & GYNECOLOGY

## 2023-06-30 PROCEDURE — 3008F BODY MASS INDEX DOCD: CPT | Performed by: OBSTETRICS & GYNECOLOGY

## 2023-06-30 PROCEDURE — 81002 URINALYSIS NONAUTO W/O SCOPE: CPT | Performed by: OBSTETRICS & GYNECOLOGY

## 2023-06-30 PROCEDURE — 3074F SYST BP LT 130 MM HG: CPT | Performed by: OBSTETRICS & GYNECOLOGY

## 2023-07-03 ENCOUNTER — TELEPHONE (OUTPATIENT)
Facility: CLINIC | Age: 33
End: 2023-07-03

## 2023-07-06 NOTE — PROGRESS NOTES
PATIENT DOES NOT WANT HER HISTORY OF SYPHILIS DISCUSSED IN FRONT OF ANYONE    MELE  +FM. Some low back pain & some increase in contractions. Moving around & busy during the day. More bothered at night. Noticeable but tolerable. No leaking fluid or bleeding. 28year old  at 38w5d   KUSH 23 by LMP c/w 9w2d US  AB+/GBS+    -cfDNA neg, GOY  -AFP declined  -COVID-19 vaccination done. Bivalent booster encouraged.   -Flu vaccination done for  season  -1 hr GTT, CBC, 3rd trim HIV, Tdap done. Pre-registration discussed   -Cervix - feels probably 1 cm, but thick, moderately firm, posterior, high. Cephalic by Leipolkavya's. 2023 at 10:49 AM. Still advise cytotec    IOL at 39-40 wk discussed. IOL scheduled  at 7:30 pm cytotec      1. Obesity (pre-preg BMI 35) & H/o what sounds like SGA infant with prior partner. +Excessive weight gain   - limit wt gain 11-20 lb - above goal at 35 lb.   - aspirin discussed   - baseline CMP & A1c ok. - early 1 hr GTT advised & ordered - patient declined   - L2 US order placed - patient declined. Wanted 20 wk US in our clinic. 20 wk US with marginal cord insertion. - 3rd trimester growth US - EFW 57%  - NST weekly at 36 wk     2. Reactive Trep Ab   -22 Initial OB labs Reactive Trep Ab, non-reactive RPR  -Did have syphilis in the past and was treated. -Per [employer] department recommendations additional information obtained from patient. Patient was treated approximately in  in [place], does not remember exact location but will try to get the information. Per Health Department, if no prove of treatment is available, patient should be treated with Penicillin G 2.4 million units IM x 2, 1 week apart.   -Per Health Department, patient instructed to call [facility] at [phone] to schedule Penicillin G injection.  Patient verbalizes understanding.  -[facility] 24w4d[facility] pt [facility] treated however they may have never gotten records faxed? ? So patient never got an answer from [facility] dept   -5/10/23 Clinic RN here spoke with Jana Mcmillan RN at the [facility] Department and confirmed that there was no written confirmation of treatment received from previous clinic. Per Denise Quintana RN patient should complete treatment with 3 doses, 7 days apart. -6/30/2023 Patient reports that because the [facility] Department was able to verbally confirm that she was treated in [place], she did NOT have to be treated.   -As of 7/6/2023 confirmed with clinic JOCELINE Rubin that the Health department did recommend treatment. Will notify pediatrician at time of L&D.     3. Sickle cell trait   -per patient report  -Encouraged partner testing due to risk for sickle cell disease     4. 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 Marginal cord insertion   -growth US at 32 wk - EFW 57%    6.  GBS carrier  -IV antibiotics in labor     RTC 1 wk with NST

## 2023-07-07 ENCOUNTER — TELEPHONE (OUTPATIENT)
Facility: CLINIC | Age: 33
End: 2023-07-07

## 2023-07-07 ENCOUNTER — ROUTINE PRENATAL (OUTPATIENT)
Facility: CLINIC | Age: 33
End: 2023-07-07
Payer: COMMERCIAL

## 2023-07-07 VITALS
HEART RATE: 117 BPM | DIASTOLIC BLOOD PRESSURE: 74 MMHG | BODY MASS INDEX: 40.38 KG/M2 | SYSTOLIC BLOOD PRESSURE: 124 MMHG | WEIGHT: 242.38 LBS | HEIGHT: 65 IN

## 2023-07-07 DIAGNOSIS — O99.213 OTHER OBESITY DUE TO EXCESS CALORIES AFFECTING PREGNANCY IN THIRD TRIMESTER: Primary | ICD-10-CM

## 2023-07-07 DIAGNOSIS — O43.193 MARGINAL INSERTION OF UMBILICAL CORD AFFECTING MANAGEMENT OF MOTHER IN THIRD TRIMESTER: ICD-10-CM

## 2023-07-07 DIAGNOSIS — O99.019 SICKLE CELL TRAIT IN MOTHER AFFECTING PREGNANCY (HCC): ICD-10-CM

## 2023-07-07 DIAGNOSIS — D57.3 SICKLE CELL TRAIT IN MOTHER AFFECTING PREGNANCY (HCC): ICD-10-CM

## 2023-07-07 DIAGNOSIS — Z87.59 HISTORY OF PRIOR PREGNANCY WITH SGA NEWBORN: ICD-10-CM

## 2023-07-07 DIAGNOSIS — Z34.83 PRENATAL CARE, SUBSEQUENT PREGNANCY IN THIRD TRIMESTER: ICD-10-CM

## 2023-07-07 DIAGNOSIS — O09.893 HISTORY OF MATERNAL SYPHILIS, CURRENTLY PREGNANT IN THIRD TRIMESTER: ICD-10-CM

## 2023-07-07 DIAGNOSIS — Z22.330 GBS CARRIER: ICD-10-CM

## 2023-07-07 DIAGNOSIS — O26.03 EXCESSIVE WEIGHT GAIN DURING PREGNANCY IN THIRD TRIMESTER: ICD-10-CM

## 2023-07-07 DIAGNOSIS — E66.09 OTHER OBESITY DUE TO EXCESS CALORIES AFFECTING PREGNANCY IN THIRD TRIMESTER: Primary | ICD-10-CM

## 2023-07-07 PROCEDURE — 3008F BODY MASS INDEX DOCD: CPT | Performed by: OBSTETRICS & GYNECOLOGY

## 2023-07-07 PROCEDURE — 81002 URINALYSIS NONAUTO W/O SCOPE: CPT | Performed by: OBSTETRICS & GYNECOLOGY

## 2023-07-07 PROCEDURE — 3074F SYST BP LT 130 MM HG: CPT | Performed by: OBSTETRICS & GYNECOLOGY

## 2023-07-07 PROCEDURE — 3078F DIAST BP <80 MM HG: CPT | Performed by: OBSTETRICS & GYNECOLOGY

## 2023-07-12 ENCOUNTER — ANESTHESIA EVENT (OUTPATIENT)
Dept: OBGYN UNIT | Facility: HOSPITAL | Age: 33
End: 2023-07-12
Payer: COMMERCIAL

## 2023-07-12 ENCOUNTER — HOSPITAL ENCOUNTER (INPATIENT)
Facility: HOSPITAL | Age: 33
LOS: 3 days | Discharge: HOME | End: 2023-07-15
Attending: OBSTETRICS & GYNECOLOGY | Admitting: OBSTETRICS & GYNECOLOGY
Payer: COMMERCIAL

## 2023-07-12 ENCOUNTER — APPOINTMENT (OUTPATIENT)
Dept: OBGYN CLINIC | Facility: HOSPITAL | Age: 33
End: 2023-07-12
Payer: COMMERCIAL

## 2023-07-12 ENCOUNTER — ANESTHESIA (OUTPATIENT)
Dept: OBGYN UNIT | Facility: HOSPITAL | Age: 33
End: 2023-07-12
Payer: COMMERCIAL

## 2023-07-12 PROBLEM — Z34.90 PREGNANCY: Status: ACTIVE | Noted: 2023-07-12

## 2023-07-12 LAB
ANTIBODY SCREEN: NEGATIVE
BASOPHILS # BLD AUTO: 0.04 X10(3) UL (ref 0–0.2)
BASOPHILS NFR BLD AUTO: 0.4 %
EOSINOPHIL # BLD AUTO: 0.13 X10(3) UL (ref 0–0.7)
EOSINOPHIL NFR BLD AUTO: 1.2 %
ERYTHROCYTE [DISTWIDTH] IN BLOOD BY AUTOMATED COUNT: 13.2 %
HCT VFR BLD AUTO: 30 %
HGB BLD-MCNC: 10.1 G/DL
IMM GRANULOCYTES # BLD AUTO: 0.06 X10(3) UL (ref 0–1)
IMM GRANULOCYTES NFR BLD: 0.6 %
LYMPHOCYTES # BLD AUTO: 1.87 X10(3) UL (ref 1–4)
LYMPHOCYTES NFR BLD AUTO: 17.4 %
MCH RBC QN AUTO: 27.2 PG (ref 26–34)
MCHC RBC AUTO-ENTMCNC: 33.7 G/DL (ref 31–37)
MCV RBC AUTO: 80.6 FL
MONOCYTES # BLD AUTO: 0.54 X10(3) UL (ref 0.1–1)
MONOCYTES NFR BLD AUTO: 5 %
NEUTROPHILS # BLD AUTO: 8.1 X10 (3) UL (ref 1.5–7.7)
NEUTROPHILS # BLD AUTO: 8.1 X10(3) UL (ref 1.5–7.7)
NEUTROPHILS NFR BLD AUTO: 75.4 %
PLATELET # BLD AUTO: 159 10(3)UL (ref 150–450)
RBC # BLD AUTO: 3.72 X10(6)UL
RH BLOOD TYPE: POSITIVE
RPR SER QL: NONREACTIVE
T PALLIDUM AB SER QL IA: REACTIVE
WBC # BLD AUTO: 10.7 X10(3) UL (ref 4–11)

## 2023-07-12 PROCEDURE — 3E0P7VZ INTRODUCTION OF HORMONE INTO FEMALE REPRODUCTIVE, VIA NATURAL OR ARTIFICIAL OPENING: ICD-10-PCS | Performed by: OBSTETRICS & GYNECOLOGY

## 2023-07-12 RX ORDER — NALBUPHINE HYDROCHLORIDE 10 MG/ML
2.5 INJECTION, SOLUTION INTRAMUSCULAR; INTRAVENOUS; SUBCUTANEOUS
Status: DISCONTINUED | OUTPATIENT
Start: 2023-07-12 | End: 2023-07-15

## 2023-07-12 RX ORDER — ONDANSETRON 2 MG/ML
4 INJECTION INTRAMUSCULAR; INTRAVENOUS EVERY 6 HOURS PRN
Status: DISCONTINUED | OUTPATIENT
Start: 2023-07-12 | End: 2023-07-13 | Stop reason: HOSPADM

## 2023-07-12 RX ORDER — BUPIVACAINE HCL/0.9 % NACL/PF 0.25 %
5 PLASTIC BAG, INJECTION (ML) EPIDURAL AS NEEDED
Status: DISCONTINUED | OUTPATIENT
Start: 2023-07-12 | End: 2023-07-15

## 2023-07-12 RX ORDER — IBUPROFEN 600 MG/1
600 TABLET ORAL ONCE AS NEEDED
Status: COMPLETED | OUTPATIENT
Start: 2023-07-12 | End: 2023-07-13

## 2023-07-12 RX ORDER — DEXTROSE, SODIUM CHLORIDE, SODIUM LACTATE, POTASSIUM CHLORIDE, AND CALCIUM CHLORIDE 5; .6; .31; .03; .02 G/100ML; G/100ML; G/100ML; G/100ML; G/100ML
INJECTION, SOLUTION INTRAVENOUS AS NEEDED
Status: DISCONTINUED | OUTPATIENT
Start: 2023-07-12 | End: 2023-07-13 | Stop reason: HOSPADM

## 2023-07-12 RX ORDER — LIDOCAINE HYDROCHLORIDE AND EPINEPHRINE 15; 5 MG/ML; UG/ML
INJECTION, SOLUTION EPIDURAL AS NEEDED
Status: DISCONTINUED | OUTPATIENT
Start: 2023-07-12 | End: 2023-07-13 | Stop reason: SURG

## 2023-07-12 RX ORDER — ACETAMINOPHEN 500 MG
500 TABLET ORAL EVERY 6 HOURS PRN
Status: DISCONTINUED | OUTPATIENT
Start: 2023-07-12 | End: 2023-07-13 | Stop reason: HOSPADM

## 2023-07-12 RX ORDER — ACETAMINOPHEN 500 MG
1000 TABLET ORAL EVERY 6 HOURS PRN
Status: DISCONTINUED | OUTPATIENT
Start: 2023-07-12 | End: 2023-07-13 | Stop reason: HOSPADM

## 2023-07-12 RX ORDER — SODIUM CHLORIDE, SODIUM LACTATE, POTASSIUM CHLORIDE, CALCIUM CHLORIDE 600; 310; 30; 20 MG/100ML; MG/100ML; MG/100ML; MG/100ML
INJECTION, SOLUTION INTRAVENOUS CONTINUOUS
Status: DISCONTINUED | OUTPATIENT
Start: 2023-07-12 | End: 2023-07-13 | Stop reason: HOSPADM

## 2023-07-12 RX ORDER — TRISODIUM CITRATE DIHYDRATE AND CITRIC ACID MONOHYDRATE 500; 334 MG/5ML; MG/5ML
30 SOLUTION ORAL AS NEEDED
Status: DISCONTINUED | OUTPATIENT
Start: 2023-07-12 | End: 2023-07-13 | Stop reason: HOSPADM

## 2023-07-12 RX ORDER — TERBUTALINE SULFATE 1 MG/ML
0.25 INJECTION, SOLUTION SUBCUTANEOUS AS NEEDED
Status: DISCONTINUED | OUTPATIENT
Start: 2023-07-12 | End: 2023-07-13 | Stop reason: HOSPADM

## 2023-07-12 RX ADMIN — LIDOCAINE HYDROCHLORIDE AND EPINEPHRINE 2 ML: 15; 5 INJECTION, SOLUTION EPIDURAL at 21:40:00

## 2023-07-12 RX ADMIN — LIDOCAINE HYDROCHLORIDE AND EPINEPHRINE 3 ML: 15; 5 INJECTION, SOLUTION EPIDURAL at 21:36:00

## 2023-07-12 NOTE — PROGRESS NOTES
Pt is a  39.3 weeks, presents to L&D for a scheduled IOL. Pt states +FM, denies any LOF or vaginal bleeding. POC rev'd with pt, pt verbalized understanding.

## 2023-07-13 LAB — T PALLIDUM ABS: REACTIVE

## 2023-07-13 PROCEDURE — 59400 OBSTETRICAL CARE: CPT | Performed by: OBSTETRICS & GYNECOLOGY

## 2023-07-13 PROCEDURE — 0KQM0ZZ REPAIR PERINEUM MUSCLE, OPEN APPROACH: ICD-10-PCS | Performed by: OBSTETRICS & GYNECOLOGY

## 2023-07-13 PROCEDURE — 3E033VJ INTRODUCTION OF OTHER HORMONE INTO PERIPHERAL VEIN, PERCUTANEOUS APPROACH: ICD-10-PCS | Performed by: OBSTETRICS & GYNECOLOGY

## 2023-07-13 RX ORDER — ACETAMINOPHEN 500 MG
1000 TABLET ORAL EVERY 6 HOURS PRN
Status: DISCONTINUED | OUTPATIENT
Start: 2023-07-13 | End: 2023-07-13

## 2023-07-13 RX ORDER — BISACODYL 10 MG
10 SUPPOSITORY, RECTAL RECTAL ONCE AS NEEDED
Status: DISCONTINUED | OUTPATIENT
Start: 2023-07-13 | End: 2023-07-13

## 2023-07-13 RX ORDER — ACETAMINOPHEN 500 MG
500 TABLET ORAL EVERY 6 HOURS PRN
Status: DISCONTINUED | OUTPATIENT
Start: 2023-07-13 | End: 2023-07-13

## 2023-07-13 RX ORDER — SIMETHICONE 80 MG
80 TABLET,CHEWABLE ORAL 3 TIMES DAILY PRN
Status: DISCONTINUED | OUTPATIENT
Start: 2023-07-13 | End: 2023-07-15

## 2023-07-13 RX ORDER — ACETAMINOPHEN 500 MG
1000 TABLET ORAL EVERY 6 HOURS PRN
Status: DISCONTINUED | OUTPATIENT
Start: 2023-07-13 | End: 2023-07-15

## 2023-07-13 RX ORDER — IBUPROFEN 600 MG/1
600 TABLET ORAL EVERY 6 HOURS
Status: DISCONTINUED | OUTPATIENT
Start: 2023-07-13 | End: 2023-07-15

## 2023-07-13 RX ORDER — DOCUSATE SODIUM 100 MG/1
100 CAPSULE, LIQUID FILLED ORAL
Status: DISCONTINUED | OUTPATIENT
Start: 2023-07-13 | End: 2023-07-15

## 2023-07-13 RX ORDER — ACETAMINOPHEN 500 MG
500 TABLET ORAL EVERY 6 HOURS PRN
Status: DISCONTINUED | OUTPATIENT
Start: 2023-07-13 | End: 2023-07-15

## 2023-07-13 RX ORDER — SIMETHICONE 80 MG
80 TABLET,CHEWABLE ORAL 3 TIMES DAILY PRN
Status: DISCONTINUED | OUTPATIENT
Start: 2023-07-13 | End: 2023-07-13

## 2023-07-13 RX ORDER — BISACODYL 10 MG
10 SUPPOSITORY, RECTAL RECTAL ONCE AS NEEDED
Status: DISCONTINUED | OUTPATIENT
Start: 2023-07-13 | End: 2023-07-15

## 2023-07-13 RX ORDER — DOCUSATE SODIUM 100 MG/1
100 CAPSULE, LIQUID FILLED ORAL
Status: DISCONTINUED | OUTPATIENT
Start: 2023-07-13 | End: 2023-07-13

## 2023-07-13 RX ORDER — IBUPROFEN 600 MG/1
600 TABLET ORAL EVERY 6 HOURS
Status: DISCONTINUED | OUTPATIENT
Start: 2023-07-13 | End: 2023-07-13

## 2023-07-13 NOTE — PROGRESS NOTES
pt transferred to assigned room in [facility], vital signs stable on transfer, ALl pt belongings sent with pt on transfer, Baby ID band verified and matched with parents report given to RN in [facility]   Report given to Gabino Pina

## 2023-07-13 NOTE — PLAN OF CARE
Problem: BIRTH - VAGINAL/ SECTION  Goal: Fetal and maternal status remain reassuring during the birth process  Description: INTERVENTIONS:  - Monitor vital signs  - Monitor fetal heart rate  - Monitor uterine activity  - Monitor labor progression (vaginal delivery)  - DVT prophylaxis (C/S delivery)  - Surgical antibiotic prophylaxis (C/S delivery)  Outcome: Progressing     Problem: PAIN - ADULT  Goal: Verbalizes/displays adequate comfort level or patient's stated pain goal  Description: INTERVENTIONS:  - Encourage pt to monitor pain and request assistance  - Assess pain using appropriate pain scale  - Administer analgesics based on type and severity of pain and evaluate response  - Implement non-pharmacological measures as appropriate and evaluate response  - Consider cultural and social influences on pain and pain management  - Manage/alleviate anxiety  - Utilize distraction and/or relaxation techniques  - Monitor for opioid side effects  - Notify MD/LIP if interventions unsuccessful or patient reports new pain  - Anticipate increased pain with activity and pre-medicate as appropriate  Outcome: Progressing     Problem: ANXIETY  Goal: Will report anxiety at manageable levels  Description: INTERVENTIONS:  - Administer medication as ordered  - Teach and rehearse alternative coping skills  - Provide emotional support with 1:1 interaction with staff  Outcome: Progressing     Problem: Patient/Family Goals  Goal: Patient/Family Long Term Goal  Description: Patient's Long Term Goal:     Interventions:  - Uncomplicated vaginal delivery    Interventions:  VS per protocol  I&O  Ice chips and sips as tolerated  EFM per protocol  Maintain IV as ordered  Antibiotics as needed per protocol  Informed consent  - See additional Care Plan goals for specific interventions  Outcome: Progressing  Goal: Patient/Family Short Term Goal  Description: Patient's Short Term Goal:     Interventions:   - Adequate pain control with delivery of infant  Interventions:  Pain assessment scores as ordered  Patient scores pain a \"3\" or less  Multidisciplinary care   Nonpharmacologic comfort measures  - See additional Care Plan goals for specific interventions  Outcome: Progressing

## 2023-07-13 NOTE — PROGRESS NOTES
Health Department RN alanna talked to this labor and delivery RN  Pt treated with antibiotic for lesion for syphillis in 2013 one time dose of antibiotic IM given and no repeat blood work done after the treated .   Current result is non reactive  Updated Parrish Ivy and Basia Deleon in [place]

## 2023-07-13 NOTE — ANESTHESIA PROCEDURE NOTES
Labor Analgesia    Date/Time: 7/12/2023 9:27 PM    Performed by: Drew Fuentes MD  Authorized by: Drew Fuentes MD      General Information and Staff    Start Time:  7/12/2023 9:27 PM  End Time:  7/12/2023 9:36 PM  Anesthesiologist:  Drew Fuentes MD  Performed by:   Anesthesiologist  Patient Location:  OB  Site Identification: surface landmarks    Reason for Block: labor epidural    Preanesthetic Checklist: patient identified, IV checked, risks and benefits discussed, monitors and equipment checked, pre-op evaluation, timeout performed, IV bolus, anesthesia consent and sterile technique used      Procedure Details    Patient Position:  Sitting  Prep: ChloraPrep    Monitoring:  Heart rate and continuous pulse ox  Approach:  Midline    Epidural Needle    Injection Technique:  SIDNEY air  Needle Type:  Tuohy  Needle Gauge:  17 G  Needle Length:  3.375 in  Needle Insertion Depth:  7.5  Location:  L3-4    Spinal Needle      Catheter    Catheter Type:  End hole  Catheter Size:  19 G  Catheter at Skin Depth:  15  Test Dose:  Negative    Assessment    Sensory Level:  T10    Additional Comments

## 2023-07-13 NOTE — PROGRESS NOTES
OB attending     Figueroa eLy 28year old  at 38w3d - IOL for obesity. Pregnancy complicated by Obesity (pre-preg BMI 35), excessive weight gain, H/o SGA infant with prior partner. H/o syphilis, Sickle cell trait, marginal cord insertion, GBS carrier      PATIENT DOES NOT WANT HER HISTORY OF SYPHILIS DISCUSSED IN FRONT OF ANYONE but Public Health Department should be notified about her case as there is no record of her being treated and she still should be treated. Pediatrician will also be notified of this issue. +FWB    IOL   -s/p 3 doses of misoprostol   -s/p SROM 7/12/2023 at 2007  -made progress, but contractions have spaced, will start IV oxytocin     Epidural working   AB+/GBS+ IV ampicillin     H/o syphilis  -7/12/2023 - Record from [facility] Department from 10/29/13 received showing initial assessment. No actual documented doses of antibiotic noted. Will try to call again tomorrow to confirm treatment. 07/12/23  2311 07/12/23  2314 07/12/23  2324 07/12/23  2334   BP: 134/64 134/65 132/65 124/65   BP Location:       Pulse: 88 84 83 84   Resp:       Temp:       TempSrc:       SpO2:       Weight:       Height:           bpm, minimal to moderate.  Had a prolonged deceleration recently   [place] about 4 min apart     Cervical Exam Data    Cervical Exam Data  Exam Time Dilation Station   2303 4 -1   2213 3.5 -2   2026 2 -2   1615 1.5 -4   1240 1.5 -4   0655 1 -3     Karli Quiles MD

## 2023-07-13 NOTE — PROGRESS NOTES
Confirmation received from [id number] regarding Tres's treatment for treponema.   Confirmation papers and lab results of patient faxed over to [facility] per Dr Osiel Hoff request.

## 2023-07-14 LAB
BASOPHILS # BLD AUTO: 0.05 X10(3) UL (ref 0–0.2)
BASOPHILS NFR BLD AUTO: 0.6 %
EOSINOPHIL # BLD AUTO: 0.16 X10(3) UL (ref 0–0.7)
EOSINOPHIL NFR BLD AUTO: 1.8 %
ERYTHROCYTE [DISTWIDTH] IN BLOOD BY AUTOMATED COUNT: 13.5 %
HCT VFR BLD AUTO: 28.8 %
HGB BLD-MCNC: 9.4 G/DL
IMM GRANULOCYTES # BLD AUTO: 0.03 X10(3) UL (ref 0–1)
IMM GRANULOCYTES NFR BLD: 0.3 %
LYMPHOCYTES # BLD AUTO: 1.89 X10(3) UL (ref 1–4)
LYMPHOCYTES NFR BLD AUTO: 21.3 %
MCH RBC QN AUTO: 27 PG (ref 26–34)
MCHC RBC AUTO-ENTMCNC: 32.6 G/DL (ref 31–37)
MCV RBC AUTO: 82.8 FL
MONOCYTES # BLD AUTO: 0.57 X10(3) UL (ref 0.1–1)
MONOCYTES NFR BLD AUTO: 6.4 %
NEUTROPHILS # BLD AUTO: 6.18 X10 (3) UL (ref 1.5–7.7)
NEUTROPHILS # BLD AUTO: 6.18 X10(3) UL (ref 1.5–7.7)
NEUTROPHILS NFR BLD AUTO: 69.6 %
PLATELET # BLD AUTO: 133 10(3)UL (ref 150–450)
RBC # BLD AUTO: 3.48 X10(6)UL
WBC # BLD AUTO: 8.9 X10(3) UL (ref 4–11)

## 2023-07-14 NOTE — PROGRESS NOTES
As we know, patient has a hx of syphilis. RPR NR, Treponemal Ab positive. Did receive records from out of state health department which stated that Patient was treated with PCN x1 on 10/29/2013 after presenting with a chancre. Spoke to the [facility]. Below is their email:    \"I have reviewed the records on R.T (: 90). In 10/2013 she presented with a cervical erosion and was treated with one dose of Benzathine PCN 2.4 M units. On 23 her RPR was NR. On 23 her RPR was NR. 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 Above is consistent with appropriate treatment for early syphilis. As long as she is asymptomatic at this time no further treatment for syphilis is indicated. Ramesh Miranda MD    [facility]\"    She is asymptomatic. No further treatment indicated. Pediatrician updated.      Doris Zhu MD

## 2023-07-14 NOTE — PLAN OF CARE
Problem: POSTPARTUM  Goal: Long Term Goal:Experiences normal postpartum course  Description: INTERVENTIONS:  - Assess and monitor vital signs and lab values. - Assess fundus and lochia. - Provide ice/sitz baths for perineum discomfort. - Monitor healing of incision/episiotomy/laceration, and assess for signs and symptoms of infection and hematoma. - Assess bladder function and monitor for bladder distention.  - Provide/instruct/assist with pericare as needed. - Provide VTE prophylaxis as needed. - Monitor bowel function.  - Encourage ambulation and provide assistance as needed. - Assess and monitor emotional status and provide social service/psych resources as needed. - Utilize standard precautions and use personal protective equipment as indicated. Ensure aseptic care of all intravenous lines and invasive tubes/drains.  - Obtain immunization and exposure to communicable diseases history. Outcome: Progressing  Goal: Establishment of adequate milk supply with medication/procedure interruptions  Description: INTERVENTIONS:  - Review techniques for milk expression (breast pumping). - Provide pumping equipment/supplies, instructions, and assistance until it is safe to breastfeed infant. Outcome: Progressing  Goal: Experiences normal breast weaning course  Description: INTERVENTIONS:  - Assess for and manage engorgement. - Instruct on breast care. - Provide comfort measures. Outcome: Progressing  Goal: Appropriate maternal -  bonding  Description: INTERVENTIONS:  - Assess caregiver- interactions. - Assess caregiver's emotional status and coping mechanisms. - Encourage caregiver to participate in  daily care. - Assess support systems available to mother/family.  - Provide /case management support as needed.   Outcome: Progressing     Problem: SAFETY ADULT - FALL  Goal: Free from fall injury  Description: INTERVENTIONS:  - Assess pt frequently for physical needs  - Identify cognitive and physical deficits and behaviors that affect risk of falls.   - [place] fall precautions as indicated by assessment.  - Educate pt/family on patient safety including physical limitations  - Instruct pt to call for assistance with activity based on assessment  - Modify environment to reduce risk of injury  - Provide assistive devices as appropriate  - Consider OT/PT consult to assist with strengthening/mobility  - Encourage toileting schedule  Outcome: Progressing     Problem: PAIN - ADULT  Goal: Verbalizes/displays adequate comfort level or patient's stated pain goal  Description: INTERVENTIONS:  - Encourage pt to monitor pain and request assistance  - Assess pain using appropriate pain scale  - Administer analgesics based on type and severity of pain and evaluate response  - Implement non-pharmacological measures as appropriate and evaluate response  - Consider cultural and social influences on pain and pain management  - Manage/alleviate anxiety  - Utilize distraction and/or relaxation techniques  - Monitor for opioid side effects  - Notify MD/LIP if interventions unsuccessful or patient reports new pain  - Anticipate increased pain with activity and pre-medicate as appropriate  Outcome: Progressing

## 2023-07-15 RX ORDER — PSEUDOEPHEDRINE HCL 30 MG
100 TABLET ORAL 2 TIMES DAILY PRN
Qty: 30 CAPSULE | Refills: 0 | Status: SHIPPED | OUTPATIENT
Start: 2023-07-15

## 2023-07-15 RX ORDER — IBUPROFEN 600 MG/1
600 TABLET ORAL EVERY 6 HOURS PRN
Qty: 30 TABLET | Refills: 0 | Status: SHIPPED | OUTPATIENT
Start: 2023-07-15

## 2023-07-15 NOTE — DISCHARGE SUMMARY
[facility]  Discharge Summary    [facility] Patient Status:  inpatient    1990 MRN [id number]   Location 8518/8419-Q Attending [facility] Day # 3 PCP No primary care provider on file. Date of Admission: 2023    Date of Discharge: 07/15/23    Admitting Diagnosis: PREGNANCY  Pregnancy    Discharge Diagnosis: s/p     Hospital Course: The patient is a 28year old female now  who was admitted on 23 at [facility] for IOL. Cytotec per protocol given. Pitocin was initiated. Epidural was placed for pain management per patient request. The patient progressed to complete. S/p  on 23. Please refer to delivery note for further details. PPD#1, the patient was doing well. She was ambulating and voiding without difficulty. She was tolerating a general diet. Pain was well controlled. She met diagnostic criteria for gHTN but no signs/symptoms of pre eclampsia. PPD#2, the patient continued to do well and was meeting post partum expectations. She was discharged to home and instructed to follow up in 3-4 days for BP check. Consultations: none    Procedures:     Complications: none    Discharge Condition: Stable    Discharge Medications: Current Discharge Medication List       Medication List        START taking these medications      docusate sodium 100 MG Caps  Commonly known as: COLACE  Take 100 mg by mouth 2 (two) times daily as needed for constipation. ibuprofen 600 MG Tabs  Commonly known as: Motrin  Take 1 tablet (600 mg total) by mouth every 6 (six) hours as needed for Pain.             CONTINUE taking these medications      Prenatal 27-0.8 MG Tabs               Where to Get Your Medications        These medications were sent to [id number] - [address] [facility], [address] [place] [address] AT [address] [facility], [phone], [address] [address]      Phone: [phone]   docusate sodium 100 MG Caps  ibuprofen 600 MG Tabs           Follow up Visits: Follow-up with [facility] in 3-4 days for BP check. Colten Mclean MD   [facility] - OBGYN        Note to patient and family   The [facility] makes medical notes available to patients in the interest of transparency. However, please be advised that this is a medical document. It is intended as [id number]-[id number] communication. It is written and medical language may contain abbreviations or verbiage that are technical and unfamiliar. It may appear blunt or direct. Medical documents are intended to carry relevant information, facts as evident, and the clinical opinion of the practitioner.

## 2023-07-15 NOTE — PLAN OF CARE
Problem: PAIN - ADULT  Goal: Verbalizes/displays adequate comfort level or patient's stated pain goal  Description: INTERVENTIONS:  - Encourage pt to monitor pain and request assistance  - Assess pain using appropriate pain scale  - Administer analgesics based on type and severity of pain and evaluate response  - Implement non-pharmacological measures as appropriate and evaluate response  - Consider cultural and social influences on pain and pain management  - Manage/alleviate anxiety  - Utilize distraction and/or relaxation techniques  - Monitor for opioid side effects  - Notify MD/LIP if interventions unsuccessful or patient reports new pain  - Anticipate increased pain with activity and pre-medicate as appropriate  Outcome: Completed     Problem: ANXIETY  Goal: Will report anxiety at manageable levels  Description: INTERVENTIONS:  - Administer medication as ordered  - Teach and rehearse alternative coping skills  - Provide emotional support with 1:1 interaction with staff  Outcome: Completed     Problem: SAFETY ADULT - FALL  Goal: Free from fall injury  Description: INTERVENTIONS:  - Assess pt frequently for physical needs  - Identify cognitive and physical deficits and behaviors that affect risk of falls. - [place] fall precautions as indicated by assessment.  - Educate pt/family on patient safety including physical limitations  - Instruct pt to call for assistance with activity based on assessment  - Modify environment to reduce risk of injury  - Provide assistive devices as appropriate  - Consider OT/PT consult to assist with strengthening/mobility  - Encourage toileting schedule  Outcome: Completed     Problem: POSTPARTUM  Goal: Long Term Goal:Experiences normal postpartum course  Description: INTERVENTIONS:  - Assess and monitor vital signs and lab values. - Assess fundus and lochia. - Provide ice/sitz baths for perineum discomfort.   - Monitor healing of incision/episiotomy/laceration, and assess for signs and symptoms of infection and hematoma. - Assess bladder function and monitor for bladder distention.  - Provide/instruct/assist with pericare as needed. - Provide VTE prophylaxis as needed. - Monitor bowel function.  - Encourage ambulation and provide assistance as needed. - Assess and monitor emotional status and provide social service/psych resources as needed. - Utilize standard precautions and use personal protective equipment as indicated. Ensure aseptic care of all intravenous lines and invasive tubes/drains.  - Obtain immunization and exposure to communicable diseases history. 7/15/2023 0827 by Iraida Lewis RN  Outcome: Completed  7/15/2023 0807 by Iraida Lewis RN  Outcome: Progressing  Goal: Optimize infant feeding at the breast  Description: INTERVENTIONS:  - Initiate breast feeding within first hour after birth. - Monitor effectiveness of current breast feeding efforts. - Assess support systems available to mother/family.  - Identify cultural beliefs/practices regarding lactation, letdown techniques, maternal food preferences. - Assess mother's knowledge and previous experience with breast feeding.  - Provide information as needed about early infant feeding cues (e.g., rooting, lip smacking, sucking fingers/hand) versus late cue of crying.  - Discuss/demonstrate breast feeding aids (e.g., infant sling, nursing footstool/pillows, and breast pumps). - Encourage mother/other family members to express feelings/concerns, and actively listen. - Educate father/SO about benefits of breast feeding and how to manage common lactation challenges. - Recommend avoidance of specific medications or substances incompatible with breast feeding.  - Assess and monitor for signs of nipple pain/trauma. - Instruct and provide assistance with proper latch. - Review techniques for milk expression (breast pumping) and storage of breast milk.  Provide pumping equipment/supplies, instructions and assistance, as needed. - Encourage rooming-in and breast feeding on demand.  - Encourage skin-to-skin contact. - Provide LC support as needed. - Assess for and manage engorgement. - Provide breast feeding education handouts and information on community breast feeding support. 7/15/2023 0827 by Britnay Buckley RN  Outcome: Completed  7/15/2023 0807 by Britany Buckley RN  Outcome: Progressing  Goal: Establishment of adequate milk supply with medication/procedure interruptions  Description: INTERVENTIONS:  - Review techniques for milk expression (breast pumping). - Provide pumping equipment/supplies, instructions, and assistance until it is safe to breastfeed infant. 7/15/2023 0827 by Britany Buckley RN  Outcome: Completed  7/15/2023 0807 by Britany Buckley RN  Outcome: Progressing  Goal: Experiences normal breast weaning course  Description: INTERVENTIONS:  - Assess for and manage engorgement. - Instruct on breast care. - Provide comfort measures. 7/15/2023 0827 by Britany Buckley RN  Outcome: Completed  7/15/2023 0807 by Britany Buckley RN  Outcome: Progressing  Goal: Appropriate maternal -  bonding  Description: INTERVENTIONS:  - Assess caregiver- interactions. - Assess caregiver's emotional status and coping mechanisms. - Encourage caregiver to participate in  daily care. - Assess support systems available to mother/family.  - Provide /case management support as needed.   7/15/2023 0827 by Britany Buckley RN  Outcome: Completed  7/15/2023 0807 by Britany Buckley RN  Outcome: Progressing

## 2023-07-15 NOTE — PLAN OF CARE
Problem: PAIN - ADULT  Goal: Verbalizes/displays adequate comfort level or patient's stated pain goal  Description: INTERVENTIONS:  - Encourage pt to monitor pain and request assistance  - Assess pain using appropriate pain scale  - Administer analgesics based on type and severity of pain and evaluate response  - Implement non-pharmacological measures as appropriate and evaluate response  - Consider cultural and social influences on pain and pain management  - Manage/alleviate anxiety  - Utilize distraction and/or relaxation techniques  - Monitor for opioid side effects  - Notify MD/LIP if interventions unsuccessful or patient reports new pain  - Anticipate increased pain with activity and pre-medicate as appropriate  Outcome: Progressing     Problem: ANXIETY  Goal: Will report anxiety at manageable levels  Description: INTERVENTIONS:  - Administer medication as ordered  - Teach and rehearse alternative coping skills  - Provide emotional support with 1:1 interaction with staff  Outcome: Progressing     Problem: SAFETY ADULT - FALL  Goal: Free from fall injury  Description: INTERVENTIONS:  - Assess pt frequently for physical needs  - Identify cognitive and physical deficits and behaviors that affect risk of falls. - [place] fall precautions as indicated by assessment.  - Educate pt/family on patient safety including physical limitations  - Instruct pt to call for assistance with activity based on assessment  - Modify environment to reduce risk of injury  - Provide assistive devices as appropriate  - Consider OT/PT consult to assist with strengthening/mobility  - Encourage toileting schedule  Outcome: Progressing     Problem: POSTPARTUM  Goal: Long Term Goal:Experiences normal postpartum course  Description: INTERVENTIONS:  - Assess and monitor vital signs and lab values. - Assess fundus and lochia. - Provide ice/sitz baths for perineum discomfort.   - Monitor healing of incision/episiotomy/laceration, and assess for signs and symptoms of infection and hematoma. - Assess bladder function and monitor for bladder distention.  - Provide/instruct/assist with pericare as needed. - Provide VTE prophylaxis as needed. - Monitor bowel function.  - Encourage ambulation and provide assistance as needed. - Assess and monitor emotional status and provide social service/psych resources as needed. - Utilize standard precautions and use personal protective equipment as indicated. Ensure aseptic care of all intravenous lines and invasive tubes/drains.  - Obtain immunization and exposure to communicable diseases history. Outcome: Progressing  Goal: Experiences normal breast weaning course  Description: INTERVENTIONS:  - Assess for and manage engorgement. - Instruct on breast care. - Provide comfort measures. Outcome: Progressing  Goal: Appropriate maternal -  bonding  Description: INTERVENTIONS:  - Assess caregiver- interactions. - Assess caregiver's emotional status and coping mechanisms. - Encourage caregiver to participate in  daily care. - Assess support systems available to mother/family.  - Provide /case management support as needed.   Outcome: Progressing

## 2023-07-18 ENCOUNTER — POSTPARTUM (OUTPATIENT)
Facility: CLINIC | Age: 33
End: 2023-07-18
Payer: COMMERCIAL

## 2023-07-18 VITALS
SYSTOLIC BLOOD PRESSURE: 126 MMHG | DIASTOLIC BLOOD PRESSURE: 80 MMHG | WEIGHT: 232 LBS | HEIGHT: 65 IN | BODY MASS INDEX: 38.65 KG/M2 | HEART RATE: 89 BPM

## 2023-07-18 NOTE — PROGRESS NOTES
She delivered 5 days ago her blood pressure it was elevated after delivery she is not on any medicine. She will continue to follow-up for postpartum.   No restrictions

## 2023-07-22 ENCOUNTER — TELEPHONE (OUTPATIENT)
Dept: OBGYN UNIT | Facility: HOSPITAL | Age: 33
End: 2023-07-22

## 2023-07-26 VITALS
HEART RATE: 75 BPM | DIASTOLIC BLOOD PRESSURE: 78 MMHG | SYSTOLIC BLOOD PRESSURE: 133 MMHG | HEIGHT: 65 IN | BODY MASS INDEX: 40.38 KG/M2 | OXYGEN SATURATION: 99 % | WEIGHT: 242.38 LBS | TEMPERATURE: 98 F | RESPIRATION RATE: 16 BRPM

## 2023-07-26 PROBLEM — Z98.890 STATUS POST INDUCTION OF LABOR: Status: ACTIVE | Noted: 2023-07-26

## 2023-07-27 NOTE — L&D DELIVERY NOTE
Juventino Simon [[id number]]      Labor Events     labor?: No   steroids?: None  Antibiotics received during labor?: Yes  Antibiotics (enter # doses in comment): ampicillin  Rupture date/time: 2023     Rupture type: SROM  Fluid color: Clear  Labor type:  Induced Onset of Labor  Induction: Misoprostol, Oxytocin  Indications for induction: Other - comment  Induction comment: Obesity   Intrapartum & labor complications: Group B beta strep +, Variable decelerations, Other - see comments  Intrapartum & labor complications comment: Obesity       Labor Length    2nd stage: 0h 27m  3rd stage: 0h 05m       Labor Event Times    Labor onset date/time: 2023  Dilation complete date/time: 2023 1381  Start pushing date/time: 2023 0641        Presentation    Presentation: Vertex  Position: Left Occiput Anterior       Operative Delivery    Operative Vaginal Delivery: No               Shoulder Dystocia    Shoulder Dystocia: No       Anesthesia    Method: Epidural              Sabael Delivery      Head delivery date/time: 2023 06:54:25   Delivery date/time:  23 06:54:37   Delivery type: Normal spontaneous vaginal delivery    Details:            Delivery location: delivery room          Delivery Providers    Delivering Clinician: Teena Boudreaux MD   Delivery personnel:  Provider Role   Estefania Devine, RN Baby Nurse   Eliezer Goss, RN Delivery Nurse             Cord    Vessels: 3 Vessels  Complications: None  Timed cord clamping: Yes  Time in sec: 61  Cord blood disposition: to lab  Gases sent?: No       Resuscitation    Method: None        Measurements      Weight: 3240 g 7 lb 2.3 oz Length: 50.8 cm     Head circum.: 33 cm Chest circum.: 31 cm      Abdominal circum.: 28.5 cm           Placenta    Date/time: 2023 0700  Removal: Spontaneous  Appearance: Intact  Disposition: held for future pathology       Apgars    Living status: Living Apgar Scoring Key:    0 1 2    Skin color Blue or pale Acrocyanotic Completely pink    Heart rate Absent <100 bpm >100 bpm    Reflex irritability No response Grimace Cry or active withdrawal    Muscle tone Limp Some flexion Active motion    Respiratory effort Absent Weak cry; hypoventilation Good, crying                1 Minute:  5 Minute:  10 Minute:  15 Minute:  20 Minute:      Skin color: 1  1       Heart rate: 2  2       Reflex irritablity: 2  2       Muscle tone: 2  2       Respiratory effort: 2  2       Total: 9  9               Apgars assigned by: Felipe MATA RN      disposition: with mother             Skin to Skin    Skin to skin initiated date/time: 2023 5717  Skin to skin with: Mother       Vaginal Count    Initial count RN: Jono Carmona RN  Initial count Tech: [place]   Sponges   Sharps    Initial counts 11   0    Final counts 11   1    Final count RN: Jono Carmona RN  Final count MD: Talia Valencia MD       Delivery (Maternal)    Episiotomy: None  Perineal lacerations: 2nd Repaired?: Yes     Periurethral laceration: left Repaired?: No     Vaginal laceration?: No      Cervical laceration?: No    Clitoral laceration?: No    Quantitative blood loss (mL): 130            Induction of labor for obesity. Misoprostol x 3 doses, spontaneous rupture of membranes, IV oxytocin. Epidural.     Delivered viable male infant via normal spontaneous vaginal delivery. Uncomplicated. Second degree laceration repaired with 3-0 vicryl. Hemostatic left periurethral laceration not repaired.      Raleigh Morrissey MD

## 2023-08-25 ENCOUNTER — POSTPARTUM (OUTPATIENT)
Facility: CLINIC | Age: 33
End: 2023-08-25
Payer: COMMERCIAL

## 2023-08-25 VITALS
WEIGHT: 224.38 LBS | HEART RATE: 105 BPM | SYSTOLIC BLOOD PRESSURE: 120 MMHG | HEIGHT: 65 IN | DIASTOLIC BLOOD PRESSURE: 78 MMHG | BODY MASS INDEX: 37.38 KG/M2

## 2023-08-25 PROCEDURE — 3074F SYST BP LT 130 MM HG: CPT | Performed by: OBSTETRICS & GYNECOLOGY

## 2023-08-25 PROCEDURE — 3008F BODY MASS INDEX DOCD: CPT | Performed by: OBSTETRICS & GYNECOLOGY

## 2023-08-25 PROCEDURE — 3078F DIAST BP <80 MM HG: CPT | Performed by: OBSTETRICS & GYNECOLOGY

## 2023-08-25 NOTE — PROGRESS NOTES
HPI    Niurka Lima is a 28year old female  here for 6 week post-partum visit. Patient delivered a  male infant on 23 via . Patient desires nothing for contraception. Patient is bottle feeding. Patient denies symptoms of depression, [place]  depression scale score of 0 out of 30. [place]  DEPRESSION SCALE  I have been able to laugh and see the funny side of things: As much as I always could  I have looked forward with enjoyment to things: As much as I ever did  I have been anxious or worried for no good reason: No, not at all  I have felt scared or panicky for no good reason: No, not at all  Things have been getting on top of me: No, I have been coping as well as ever  I have been so unhappy that I have had difficulty sleeping: Not at all  I have felt sad or miserable: No, not at all  I have been so unhappy that I have been crying: No, never  The thought of harming myself has occurred to me: Never  Total: 0    OBSTETRIC HISTORY  OB History    Para Term  AB Living   2 2 2     2   SAB IAB Ectopic Multiple Live Births         0 2      # Outcome Date GA Lbr Vladislav/2nd Weight Sex Delivery Anes PTL Lv   2 Term 23 39w4d / 00:27 7 lb 2.3 oz (3.24 kg) M NORMAL SPONT EPI N YASH      Complications: Group B beta strep +, Variable decelerations, Other - see comments   1 Term 11 38w0d  5 lb (2.268 kg) M NORMAL SPONT   YASH      Obstetric Comments   Has a stepson with boyfriend    2011 - 5 lb baby. Doesn't remember being told he was IUGR/SGA. He did have elevated bilirubin level. GYNE HISTORY        MEDICATIONS:    Current Outpatient Medications:     Prenatal 27-0.8 MG Oral Tab, , Disp: , Rfl:     docusate sodium 100 MG Oral Cap, Take 100 mg by mouth 2 (two) times daily as needed for constipation.  (Patient not taking: Reported on 2023), Disp: 30 capsule, Rfl: 0    ibuprofen 600 MG Oral Tab, Take 1 tablet (600 mg total) by mouth every 6 (six) hours as needed for Pain. (Patient not taking: Reported on 8/25/2023), Disp: 30 tablet, Rfl: 0    ALLERGIES:  No Known Allergies    PHYSICAL EXAM  Blood pressure 120/78, pulse 105, height 65\", weight 224 lb 6.4 oz (101.8 kg), last menstrual period 10/09/2022, not currently breastfeeding. General:  Well nourished, well developed woman in no acute distress  Abdomen:  soft, nontender, no masses  External Genitalia: normal appearance, hair distribution, and no lesions  Vagina:  Normal appearance without lesions, no abnormal discharge  Cervix:  Normal without tenderness on motion  Uterus: normal in size, contour, position, mobility, without tenderness  Adnexa: normal without masses or tenderness  Perineum: well healed perineum  Anus: no hemorroids       ASSESSMENT/PLAN  Vivek Goss was seen today for postpartum care. Diagnoses and all orders for this visit:    Postpartum exam      Discussed all options of birthcontrol including ocps, minipill, Mirena or Paragard IUD, nuvaring, orthoevra patch, nexplanon, Depoprovera, condoms or tubal sterilization options. Patient has chosen condom. Patient to return for annual gyne exam in February.

## 2024-03-06 NOTE — PROGRESS NOTES
Has a pediatrician in a car seat. Hydration was discussed. She is having some [employer]. No swelling in her hands or feet.   2 weeks Quality 431: Preventive Care And Screening: Unhealthy Alcohol Use - Screening: Patient not identified as an unhealthy alcohol user when screened for unhealthy alcohol use using a systematic screening method Detail Level: Detailed Quality 226: Preventive Care And Screening: Tobacco Use: Screening And Cessation Intervention: Tobacco Screening not Performed